# Patient Record
Sex: MALE | Race: WHITE | NOT HISPANIC OR LATINO | Employment: UNEMPLOYED | URBAN - METROPOLITAN AREA
[De-identification: names, ages, dates, MRNs, and addresses within clinical notes are randomized per-mention and may not be internally consistent; named-entity substitution may affect disease eponyms.]

---

## 2017-01-27 PROCEDURE — 99282 EMERGENCY DEPT VISIT SF MDM: CPT

## 2017-01-28 ENCOUNTER — APPOINTMENT (OUTPATIENT)
Dept: GENERAL RADIOLOGY | Facility: HOSPITAL | Age: 37
End: 2017-01-28

## 2017-01-28 ENCOUNTER — HOSPITAL ENCOUNTER (EMERGENCY)
Facility: HOSPITAL | Age: 37
Discharge: HOME | End: 2017-01-28
Attending: EMERGENCY MEDICINE | Admitting: EMERGENCY MEDICINE

## 2017-01-28 VITALS
RESPIRATION RATE: 16 BRPM | SYSTOLIC BLOOD PRESSURE: 143 MMHG | HEIGHT: 71 IN | HEART RATE: 88 BPM | OXYGEN SATURATION: 97 % | BODY MASS INDEX: 33.6 KG/M2 | DIASTOLIC BLOOD PRESSURE: 95 MMHG | WEIGHT: 240 LBS | TEMPERATURE: 97.8 F

## 2017-01-28 DIAGNOSIS — S60.022A CONTUSION OF LEFT INDEX FINGER WITHOUT DAMAGE TO NAIL, INITIAL ENCOUNTER: Primary | ICD-10-CM

## 2017-01-28 PROCEDURE — 99282 EMERGENCY DEPT VISIT SF MDM: CPT | Performed by: EMERGENCY MEDICINE

## 2017-01-28 PROCEDURE — 73140 X-RAY EXAM OF FINGER(S): CPT

## 2017-01-28 RX ORDER — ALBUTEROL SULFATE 2.5 MG/3ML
2.5 SOLUTION RESPIRATORY (INHALATION) EVERY 4 HOURS PRN
COMMUNITY

## 2017-10-15 ENCOUNTER — APPOINTMENT (OUTPATIENT)
Dept: GENERAL RADIOLOGY | Facility: HOSPITAL | Age: 37
End: 2017-10-15

## 2017-10-15 ENCOUNTER — HOSPITAL ENCOUNTER (EMERGENCY)
Facility: HOSPITAL | Age: 37
Discharge: HOME | End: 2017-10-15
Attending: EMERGENCY MEDICINE | Admitting: EMERGENCY MEDICINE

## 2017-10-15 VITALS
OXYGEN SATURATION: 100 % | TEMPERATURE: 98.3 F | HEART RATE: 58 BPM | WEIGHT: 204 LBS | BODY MASS INDEX: 28.56 KG/M2 | HEIGHT: 71 IN | SYSTOLIC BLOOD PRESSURE: 128 MMHG | DIASTOLIC BLOOD PRESSURE: 75 MMHG | RESPIRATION RATE: 20 BRPM

## 2017-10-15 DIAGNOSIS — M25.531 RIGHT WRIST PAIN: Primary | ICD-10-CM

## 2017-10-15 PROCEDURE — 73110 X-RAY EXAM OF WRIST: CPT

## 2017-10-15 PROCEDURE — 99284 EMERGENCY DEPT VISIT MOD MDM: CPT | Performed by: EMERGENCY MEDICINE

## 2017-10-15 PROCEDURE — 99283 EMERGENCY DEPT VISIT LOW MDM: CPT

## 2017-10-15 RX ORDER — ACETAMINOPHEN 500 MG
500 TABLET ORAL ONCE
Status: COMPLETED | OUTPATIENT
Start: 2017-10-15 | End: 2017-10-15

## 2017-10-15 RX ADMIN — ACETAMINOPHEN 500 MG: 500 TABLET, FILM COATED ORAL at 16:56

## 2017-10-15 NOTE — DISCHARGE INSTRUCTIONS
Rest, ice, elevate as needed.  May take ibuprofen or Tylenol every 8 hours as needed for pain.  May wear the Velcro wrist splint as directed for additional comfort.  May need to follow-up with primary care doctor or orthopedics specialist this week if symptoms are not improving.

## 2017-10-16 NOTE — ED PROVIDER NOTES
Subjective   History of Present Illness  History of Present Illness    Chief complaint: Wrist pain    Location: Right wrist    Quality/Severity:  Moderate pain    Timing/Duration: Began this morning    Modifying Factors: Worse with movement of the hand or wrist    Narrative: This patient presents for evaluation of new onset right-sided wrist pain symptoms that began this morning after he woke up.  He denies any recent injury to the right wrist.  He denies any recent overuse pattern or strain repetitive pattern to the right hand or wrist area.  He says he is left-hand dominant.  He does not work and occupation.  He does work on automobiles as a hobby, however.  He has not had any wrist surgeries or chronic respiratory problems in the past.  He has not taken any medications for the pain so far today.    Associated Symptoms: As above    Review of Systems   Constitutional: Negative for activity change and diaphoresis.   HENT: Negative.    Cardiovascular: Negative for chest pain.   Gastrointestinal: Negative for abdominal pain.   Musculoskeletal: Positive for arthralgias. Negative for gait problem and joint swelling.   Skin: Negative for color change, rash and wound.   Neurological: Positive for numbness. Negative for syncope. Light-headedness: mild numbness in the right hand only.   All other systems reviewed and are negative.      Past Medical History:   Diagnosis Date   • Asthma        Allergies   Allergen Reactions   • Flexeril [Cyclobenzaprine]    • Motrin [Ibuprofen]    • Penicillins    • Toradol [Ketorolac Tromethamine]    • Tramadol        No past surgical history on file.    No family history on file.    Social History     Social History   • Marital status: Single     Spouse name: N/A   • Number of children: N/A   • Years of education: N/A     Social History Main Topics   • Smoking status: Current Every Day Smoker     Packs/day: 0.50     Types: Cigarettes   • Smokeless tobacco: Not on file   • Alcohol use No   •  Drug use: Not on file   • Sexual activity: Not on file     Other Topics Concern   • Not on file     Social History Narrative   • No narrative on file     ED Triage Vitals   Temp Heart Rate Resp BP SpO2   10/15/17 1623 10/15/17 1623 10/15/17 1623 10/15/17 1623 10/15/17 1623   98.3 °F (36.8 °C) 58 20 128/75 100 %      Temp src Heart Rate Source Patient Position BP Location FiO2 (%)   10/15/17 1623 -- 10/15/17 1623 10/15/17 1623 --   Oral  Sitting Right arm            Objective   Physical Exam   Constitutional: He is oriented to person, place, and time. He appears well-developed and well-nourished. No distress.   HENT:   Head: Normocephalic and atraumatic.   Eyes: EOM are normal. Pupils are equal, round, and reactive to light. Right eye exhibits no discharge. Left eye exhibits no discharge.   Neck: Normal range of motion. Neck supple.   Cardiovascular: Normal rate and intact distal pulses.    Pulmonary/Chest: Effort normal. No respiratory distress.   Musculoskeletal: Normal range of motion. He exhibits tenderness. He exhibits no edema or deformity.   Mild tenderness diffusely throughout the entire right wrist area.  There are no gross deformities.  There is no significant area of maximal tenderness evident.  The patient is able to flex and extend the wrist through normal range of motion and appropriate strength.  Distal sensation appears to be intact and normal to all 5 fingers equally.   Neurological: He is alert and oriented to person, place, and time. He exhibits normal muscle tone.   Skin: Skin is warm and dry. No rash noted. He is not diaphoretic. No erythema.   Psychiatric: He has a normal mood and affect. His behavior is normal. Judgment and thought content normal.   Nursing note and vitals reviewed.    RADIOLOGY        Study: X-ray right wrist    Findings: No fracture or dislocation    Interpreted contemporaneously with treatment by myself, independently viewed by me        Procedures         ED Course  ED  Course   Comment By Time   I reviewed the x-ray which looks normal.  The patient's wrist pain symptom is of uncertain origin.  It could be carpal tunnel syndrome although there is not a strong history to suggest that.  The pain has only been present for 1 day.  There really are no worrisome features to his physical exam.  We put him in a Velcro wrist splint for comfort.  I advised follow-up with primary care doctor this week if not improving. Dru Dominguez MD 10/15 2320                  MDM  Number of Diagnoses or Management Options  Right wrist pain:      Amount and/or Complexity of Data Reviewed  Tests in the radiology section of CPT®: ordered and reviewed  Independent visualization of images, tracings, or specimens: yes    Risk of Complications, Morbidity, and/or Mortality  Presenting problems: moderate  Diagnostic procedures: moderate  Management options: moderate        Final diagnoses:   Right wrist pain            Dru Dominguez MD  10/15/17 4557

## 2017-12-07 ENCOUNTER — HOSPITAL ENCOUNTER (EMERGENCY)
Facility: HOSPITAL | Age: 37
Discharge: HOME | End: 2017-12-08
Attending: EMERGENCY MEDICINE | Admitting: EMERGENCY MEDICINE

## 2017-12-07 VITALS
RESPIRATION RATE: 16 BRPM | BODY MASS INDEX: 30.1 KG/M2 | DIASTOLIC BLOOD PRESSURE: 87 MMHG | WEIGHT: 215 LBS | HEIGHT: 71 IN | HEART RATE: 74 BPM | TEMPERATURE: 98.3 F | SYSTOLIC BLOOD PRESSURE: 128 MMHG | OXYGEN SATURATION: 98 %

## 2017-12-07 DIAGNOSIS — S76.119A: ICD-10-CM

## 2017-12-07 DIAGNOSIS — S39.012A STRAIN OF LUMBAR REGION, INITIAL ENCOUNTER: Primary | ICD-10-CM

## 2017-12-07 PROCEDURE — 99282 EMERGENCY DEPT VISIT SF MDM: CPT | Performed by: EMERGENCY MEDICINE

## 2017-12-07 PROCEDURE — 99282 EMERGENCY DEPT VISIT SF MDM: CPT

## 2017-12-07 RX ORDER — METHOCARBAMOL 500 MG/1
500 TABLET, FILM COATED ORAL 3 TIMES DAILY PRN
Qty: 20 TABLET | Refills: 0 | Status: SHIPPED | OUTPATIENT
Start: 2017-12-07
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
